# Patient Record
Sex: FEMALE | URBAN - NONMETROPOLITAN AREA
[De-identification: names, ages, dates, MRNs, and addresses within clinical notes are randomized per-mention and may not be internally consistent; named-entity substitution may affect disease eponyms.]

---

## 2023-11-06 ENCOUNTER — LAB OUTSIDE AN ENCOUNTER (OUTPATIENT)
Dept: URBAN - NONMETROPOLITAN AREA CLINIC 2 | Facility: CLINIC | Age: 38
End: 2023-11-06

## 2023-11-06 ENCOUNTER — OFFICE VISIT (OUTPATIENT)
Dept: URBAN - NONMETROPOLITAN AREA CLINIC 2 | Facility: CLINIC | Age: 38
End: 2023-11-06
Payer: COMMERCIAL

## 2023-11-06 ENCOUNTER — DASHBOARD ENCOUNTERS (OUTPATIENT)
Age: 38
End: 2023-11-06

## 2023-11-06 VITALS
TEMPERATURE: 98.5 F | SYSTOLIC BLOOD PRESSURE: 109 MMHG | WEIGHT: 142.2 LBS | BODY MASS INDEX: 26.17 KG/M2 | HEART RATE: 68 BPM | DIASTOLIC BLOOD PRESSURE: 75 MMHG | HEIGHT: 62 IN

## 2023-11-06 DIAGNOSIS — R13.19 ESOPHAGEAL DYSPHAGIA: ICD-10-CM

## 2023-11-06 PROBLEM — 40890009: Status: ACTIVE | Noted: 2023-11-06

## 2023-11-06 PROBLEM — 16331000: Status: ACTIVE | Noted: 2023-11-06

## 2023-11-06 PROCEDURE — 99244 OFF/OP CNSLTJ NEW/EST MOD 40: CPT | Performed by: NURSE PRACTITIONER

## 2023-11-06 PROCEDURE — 99204 OFFICE O/P NEW MOD 45 MIN: CPT | Performed by: NURSE PRACTITIONER

## 2023-11-06 NOTE — HPI-TODAY'S VISIT:
11/6/2023 Hillary is a 38-year-old female referred to me by Dr. Sveta Alva for consultation of esophageal reflux heartburn and dysphagia.  A copy this note be sent to the referring physician.  6 weeks ago she developed acute epigastric abdominal pain with reflux and regurgitation after doing consecutive days of handstands with yoga.  She is very concerned about hiatal hernia.  She been drinking peppermint tea with worsening symptoms.  She is never tried any Pepcid or PPI therapy.  She never had an EGD.  Today she agrees to an 8-week trial of PPI therapy, she would like to pursue barium imaging given her positional changes that elicited the symptoms, if no relief on PPI therapy and normal barium study consider EGD in 8 weeks.  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  MB

## 2023-11-06 NOTE — PHYSICAL EXAM NECK/THYROID:
normal appearance , no deformities , trachea midline Adequate: hears normal conversation without difficulty

## 2023-12-28 ENCOUNTER — TELEPHONE ENCOUNTER (OUTPATIENT)
Dept: URBAN - NONMETROPOLITAN AREA CLINIC 2 | Facility: CLINIC | Age: 38
End: 2023-12-28

## 2024-02-21 ENCOUNTER — EGD (OUTPATIENT)
Dept: URBAN - NONMETROPOLITAN AREA SURGERY CENTER 1 | Facility: SURGERY CENTER | Age: 39
End: 2024-02-21

## 2024-04-15 ENCOUNTER — OV EP (OUTPATIENT)
Dept: URBAN - NONMETROPOLITAN AREA CLINIC 2 | Facility: CLINIC | Age: 39
End: 2024-04-15

## 2024-07-02 ENCOUNTER — OFFICE VISIT (OUTPATIENT)
Dept: URBAN - NONMETROPOLITAN AREA CLINIC 2 | Facility: CLINIC | Age: 39
End: 2024-07-02